# Patient Record
(demographics unavailable — no encounter records)

---

## 2024-12-11 NOTE — DISCUSSION/SUMMARY
[de-identified] : This patient presents today for follow-up of her left knee osteoarthritis.  Patient presents today with recurrence of his symptoms.  He is noted to have an exacerbation of the underlying osteoarthritis.  He did do well with viscosupplementation last year.  We discussed further treatment options and I recommended another course of viscosupplementation.  Will request authorization from the insurance carrier and call him on the medicine received in the office to begin treatment.  At least 30 minutes was spent performing the evaluation and management on today's office visit.  This includes but is not limited to preparing to see patient including review of any test results or outside medical records, obtaining and/or reviewing separately obtained history, performing examination and evaluation, counseling and educating the patient on their diagnosis and treatment recommendations, ordering medications, tests, or procedures, documenting clinical information in the electronic health record, independently interpreting results (not separately reported) and communicating results to the patient, and coordination of care.

## 2024-12-11 NOTE — PHYSICAL EXAM
[de-identified] : The patient appears well nourished  and in no apparent distress.  The patient is alert and oriented to person, place, and time.   Affect and mood appear normal.    The head is normocephalic and atraumatic.  The eyes reveal normal sclera and extra ocular muscles are intact.   The neck appears normal with no jugular venous distention or masses noted.   Skin shows normal turgor with no evidence of eczema or psoriasis.  No respiratory distress noted.  The patient ambulates with an antalgic gait.  The left knee has range of motion 0 to 130 degrees.  There is no pain with range of motion.  There is no effusion.  Crepitation is noted.  Tenderness about the patellofemoral joint noted.   There is a negative Eliel sign.  There is no soft tissue swelling, warmth, or erythema.   There is a negative Lachman sign.  There is no instability to varus/valgus stress.  There is no instability to anterior/posterior drawer.  There is normal strength  in the quadriceps and hamstring muscles.  Strength and sensation are intact distally.  There are normal pulses distally and good capillary refill.  No edema or lymphadenopathy noted.

## 2024-12-11 NOTE — REASON FOR VISIT
[Follow-Up Visit] : a follow-up visit for [Knee Pain] : knee pain [FreeTextEntry2] : follow up evaluation for primary osteoarthritis of he left knee.

## 2024-12-11 NOTE — HISTORY OF PRESENT ILLNESS
[de-identified] : This patient presents today for follow-up regarding left knee osteoarthritis.  He did have viscosupplementation in December of last year.  He is noting recurrence of pain over the last few months.  He feels he got about 10 months of relief of symptoms.  He presents today with pain level 8 out of 10.  Pain worse with activity such as ambulation and stair climbing.  Pain is improved with rest.  Takes tramadol and ibuprofen intermittently for the pain.  Denies any recent injury.  Denies radicular symptoms or numbness and tingling.

## 2024-12-16 NOTE — HISTORY OF PRESENT ILLNESS
[de-identified] : Patient is a 64-year-old male who presents today for an evaluation regarding his right hip.  He is status post right total hip replacement performed by Dr. Betancur in March 12, 2014.  Overall he states he has been doing very well but does have some residual discomfort at times.  He states it is mostly over the lateral aspect of the hip.  He has returned numerous times to the office.  And has received cortisone injection due to GTB.  Which does include improve his condition.  He states that he was doing well since his last office visit 1 year ago in October.  But started to notice over the summer some increased discomfort.  He denies any specific injury or accident.  Denies any pain radiating into the groin.

## 2024-12-16 NOTE — DISCUSSION/SUMMARY
[de-identified] : After thorough history full examination and review of the x-rays.  Patient was assured that he is doing very well following his right total hip replacement.  He was assured that the prosthesis is in place and shows good alignment with fixation.  However there is some lateral complaints of pain.  It was deemed that the patient does have some greater trochanteric bursitis.  And therefore has received injections in the past including 1 today.  He was explained the risk benefits pros and cons of the injection as well as alternatives.  He was also explained that there is no guarantee for complete relief.  And that if there is any relief this may be short-lived as the pain may return.  He was explained that the majority of his discomfort is laterally.  But we may also get an MRI to fully evaluate the prosthesis and surrounding tissues.  This was also given to the patient in the form of a prescription.  And advised to try the injection today and see if there is any improvement.  If he does feel an improvement and his symptoms disappear.  He is advised to continue with conservative measures including therapy exercises and anti-inflammatories.  But if the pain still persist he is advised to go for the MRI and notify the office once is performed to discuss its findings and further medical management.  35 minutes were spent, face to face, in direct consultation with the patient. This includes reviewing the natural history of their Dx., eliciting the history, performing an orthopedic exam, review of the x-ray findings, forming a differential Dx and discussing all treatment options. This Includes both surgical and non-surgical treatments. I also reviewed all the risks and benefits of non-operative & operative Tx options, future impact into orthopedic functions/problems, activity restrictions both at home and at work, and all follow up requirements.

## 2024-12-16 NOTE — PHYSICAL EXAM
[Antalgic] : antalgic [LE] : Sensory: Intact in bilateral lower extremities [ALL] : dorsalis pedis, posterior tibial, femoral, popliteal, and radial 2+ and symmetric bilaterally [de-identified] : On physical examination of the right hip.  The skin is clean dry and intact with a well-healed surgical scar noted.  There is no evidence of infection superficial or deep.  There is no redness swelling heat discharge fever noted.  There is some pain and tenderness noted but mostly on the lateral aspect of the hip at the area of the greater trochanteric bursa.  He does have excellent range of motion both passive and actively.  However against resistance there is some discomfort with abduction against resistance on the lateral aspect.  There is no evidence of limb length discrepancy.  No evidence of any muscle atrophy.  No evidence of any motor or sensory deficit.  Patient has good distal pulses with no calf tenderness. [de-identified] : X-rays of the right hip were taken in the office today. This includes 2 views. The AP pelvis and lateral hip. They reveal a status post left total hip replacement. The hardware is in perfect placement and shows excellent alignment as well as fixation. There is no evidence of limb length discrepancies. There is no evidence of any fracture, subsidence of the implant, dislocation or loosening of the prosthesis.

## 2024-12-16 NOTE — PROCEDURE
[Injection] : Injection [Right] : of the right [Greater Trochanter] : greater trochanteric bursa [Inflammation] : Inflammation [Patient] : patient [Risk] : risk [Benefits] : benefits [Alternatives] : alternatives [Bleeding] : bleeding [Infection] : infection [Allergic Reaction] : allergic reaction [Verbal Consent Obtained] : verbal consent was obtained prior to the procedure [Alcohol] : Alcohol [Ethyl Chloride Spray] : ethyl chloride spray was used as a topical anesthetic [Lateral] : lateral [Spinal] : spinal needle [1% Lidocaine___(mL)] : [unfilled] mL of 1% Lidocaine [Betameth. 6mg/mL___(mL)] : [unfilled] mL of 6mg/mL betamethasone [Bandage Applied] : a bandage [Tolerated Well] : The patient tolerated the procedure well [None] : none

## 2024-12-16 NOTE — REASON FOR VISIT
[Initial Visit] : an initial visit for [Hip Pain] : hip pain [Artificial Hip Joint] : an artificial hip joint [FreeTextEntry2] : Right total hip replacement 3/12/2014 by

## 2025-01-07 NOTE — PROCEDURE
[de-identified] : A 22gauge needle was sterilely placed onto the syringe of Euflexxa.  The  left knee was then sterilely prepped with chlorhexidine and ethylene chloride spray was used as an anesthetic just prior to the injection.  Under ultrasound guidance utilizing the Zepeda Lumify ultrasound probe the Euflexxa was injected into left knee joint just above and lateral to the patella into the suprapatellar pouch.  Placement of the injection into the suprapatellar pouch was confirmed by ultrasound and a spot image was saved.  The patient tolerated the procedure well without difficulty.  The patient was given instructions on the use of ice and anti-inflammatories for post injection site soreness.

## 2025-01-07 NOTE — PHYSICAL EXAM
[de-identified] : The patient appears well nourished  and in no apparent distress.  The patient is alert and oriented to person, place, and time.   Affect and mood appear normal.    The head is normocephalic and atraumatic.  The eyes reveal normal sclera and extra ocular muscles are intact.   The neck appears normal with no jugular venous distention or masses noted.   Skin shows normal turgor with no evidence of eczema or psoriasis.  No respiratory distress noted.  The patient ambulates with an antalgic gait.  The left knee has range of motion 0 to 130 degrees.  There is no pain with range of motion.  There is no effusion.  Crepitation is noted.  Tenderness about the patellofemoral joint noted.   There is a negative Eliel sign.  There is no soft tissue swelling, warmth, or erythema.   There is a negative Lachman sign.  There is no instability to varus/valgus stress.  There is no instability to anterior/posterior drawer.  There is normal strength  in the quadriceps and hamstring muscles.  Strength and sensation are intact distally.  There are normal pulses distally and good capillary refill.  No edema or lymphadenopathy noted.

## 2025-01-07 NOTE — HISTORY OF PRESENT ILLNESS
[de-identified] : This patient presents today for the first Euflexxa injection to the left knee.  Patient continues to complain of pain about the left knee which is 3 out of 10.  Pain worse with activity and improved with rest.  Patient takes Tylenol for the pain.

## 2025-01-07 NOTE — DISCUSSION/SUMMARY
[de-identified] : On todays visit we injected the  dose of hyaluronic acid without difficulty.  Instructions were given postinjection regarding ice, analgesics, and modification of activities.  I will see the patient back in one week for the next injection.

## 2025-01-14 NOTE — REASON FOR VISIT
[Follow-Up Visit] : a follow-up visit for [FreeTextEntry2] : Primary osteosrthritis of left knee, Euflexxa # 2

## 2025-01-14 NOTE — HISTORY OF PRESENT ILLNESS
[de-identified] : This patient presents today for the second Euflexxa injection to the left knee.  Patient notes slight improvement after the first injection.  No adverse effects noted.  Pain level 4-5 out of 10.

## 2025-01-14 NOTE — DISCUSSION/SUMMARY
[de-identified] : On todays visit we injected the  dose of hyaluronic acid without difficulty.  Instructions were given postinjection regarding ice, analgesics, and modification of activities.  I will see the patient back in one week for the next injection.

## 2025-01-14 NOTE — PHYSICAL EXAM
[de-identified] : The patient appears well nourished  and in no apparent distress.  The patient is alert and oriented to person, place, and time.   Affect and mood appear normal.    The head is normocephalic and atraumatic.  The eyes reveal normal sclera and extra ocular muscles are intact.   The neck appears normal with no jugular venous distention or masses noted.   Skin shows normal turgor with no evidence of eczema or psoriasis.  No respiratory distress noted.  The patient ambulates with an antalgic gait.  The left knee has range of motion 0 to 130 degrees.  There is no pain with range of motion.  There is no effusion.  Crepitation is noted.  Tenderness about the patellofemoral joint noted.   There is a negative Eliel sign.  There is no soft tissue swelling, warmth, or erythema.   There is a negative Lachman sign.  There is no instability to varus/valgus stress.  There is no instability to anterior/posterior drawer.  There is normal strength  in the quadriceps and hamstring muscles.  Strength and sensation are intact distally.  There are normal pulses distally and good capillary refill.  No edema or lymphadenopathy noted.

## 2025-01-14 NOTE — PROCEDURE
[de-identified] : A 22gauge needle was sterilely placed onto the syringe of Euflexxa.  The  left knee was then sterilely prepped with chlorhexidine and ethylene chloride spray was used as an anesthetic just prior to the injection.  Under ultrasound guidance utilizing the Zepeda Lumify ultrasound probe the Euflexxa was injected into left knee joint just above and lateral to the patella into the suprapatellar pouch.  Placement of the injection into the suprapatellar pouch was confirmed by ultrasound and a spot image was saved.  The patient tolerated the procedure well without difficulty.  The patient was given instructions on the use of ice and anti-inflammatories for post injection site soreness.

## 2025-01-21 NOTE — REASON FOR VISIT
[Follow-Up Visit] : a follow-up visit for [FreeTextEntry2] : Primary osteoarthritis of left knee here for Euflexxa #3

## 2025-01-21 NOTE — DISCUSSION/SUMMARY
[de-identified] : On today's visit we injected the last dose of hyaluronic acid to the knee under sterile conditions.  Instructions were given to the patient regarding postinjection pain for ice, analgesics, and modification of activities.  I will see the patient back in the office if they have recurrence of symptoms.

## 2025-01-21 NOTE — PROCEDURE
[de-identified] : A 22gauge needle was sterilely placed onto the syringe of Euflexxa.  The  left knee was then sterilely prepped with chlorhexidine and ethylene chloride spray was used as an anesthetic just prior to the injection.  Under ultrasound guidance utilizing the Zepeda Lumify ultrasound probe the Euflexxa was injected into left knee joint just above and lateral to the patella into the suprapatellar pouch.  Placement of the injection into the suprapatellar pouch was confirmed by ultrasound and a spot image was saved.  The patient tolerated the procedure well without difficulty.  The patient was given instructions on the use of ice and anti-inflammatories for post injection site soreness.

## 2025-01-21 NOTE — HISTORY OF PRESENT ILLNESS
[de-identified] : This patient presents today for the last Euflexxa injection to the left knee.  He is noting improvement after the first 2 injections.  Pain level 4-5 out of 10.  Pain is exacerbated by the cold weather.  He notes some slight swelling about the knee.  He uses Motrin for pain.

## 2025-01-21 NOTE — PHYSICAL EXAM
[de-identified] : The patient appears well nourished  and in no apparent distress.  The patient is alert and oriented to person, place, and time.   Affect and mood appear normal.    The head is normocephalic and atraumatic.  The eyes reveal normal sclera and extra ocular muscles are intact.   The neck appears normal with no jugular venous distention or masses noted.   Skin shows normal turgor with no evidence of eczema or psoriasis.  No respiratory distress noted.  The patient ambulates with an antalgic gait.  The left knee has range of motion 0 to 130 degrees.  There is no pain with range of motion.  There is no effusion.  Crepitation is noted.  Tenderness about the patellofemoral joint noted.   There is a negative Eliel sign.  There is no soft tissue swelling, warmth, or erythema.   There is a negative Lachman sign.  There is no instability to varus/valgus stress.  There is no instability to anterior/posterior drawer.  There is normal strength  in the quadriceps and hamstring muscles.  Strength and sensation are intact distally.  There are normal pulses distally and good capillary refill.  No edema or lymphadenopathy noted.

## 2025-01-27 NOTE — ASSESSMENT
[FreeTextEntry1] :  1. Journal all foods eaten every day, and especially for two days prior to seeing me again 2. either get me your most recent labs or go to a [facility] lab on an 8 hour fast 3. Non starchy vegetables with lunch and dinner: as much as you want 4. No eating out limit as much as poss, be careful on ordering, no soda 5 Exercise: the goal is for 150 min of cardiovascular exercise and 60 minutes of weight training per week, start with the seated exercises 6. 647 bread if interested 7. Nutritional evaluation in the future possible  8 Finish your last eating at best by 7 pm and at worst by 8 pm and you need 2 hours from your finished meal prior to going to bed 9. No alcohol  not an issue  10. Lots of plant based and whole grain foods: beans, brown rice, grains and starchy vegetables 11. Follow up 12 consider glp in the future: no family hx of thyroid cancers. letter of nodules in the thyroids or us of the thyroid.   65 min

## 2025-01-27 NOTE — REASON FOR VISIT
[Initial Evaluation] : an initial evaluation [FreeTextEntry1] :  The patient consents to this telehealth visit, is in [place], [place], I am in [place], [place], This service was provided using 2-way audiovisual technology. The patient and provider provider participated in this telehealth visit.

## 2025-01-27 NOTE — HISTORY OF PRESENT ILLNESS
[Improved Health] : Improved health [Quality of Life] : Quality of life [Improved Mobility] : Improved mobility [Young Adult] : yound adult [Cut/Track Calories] : cut/track calories [Low Carb Diet (Atkins/Keto)] : low carb diet (Atkins/Keto) [Exercise: ____] : exercise: [unfilled] [Poor eating habits] : poor eating habits [Cravings] : cravings [Portions/overeating] : portions/overeating [Medical conditions] : medical conditions [Change in activity level] : change in activity level [FreeTextEntry2] : 200 [FreeTextEntry3] : 235 [6] : 6 [I usually sleep 6-8 hours] : I usually sleep 6-8 hours [I snore] : I snore [Breakfast] : breakfast [Dinner] : dinner [Afternoon] : afternoon [Evening] : evening [Sweet] : sweet [Baked goods] : baked goods [Other: ___] : [unfilled] [Less than 1] : Dairy: less than 1 [1-2] : Sweets: 1-2 [2] : Fast food - meals per week: 2 [8+] : How many cups of water do you regularly drink per day: 8+ [1] : Cups of tea per day: 1 [Milk] : milk [Sugar] : sugar [Spouse/partner] : spouse/partner [Self] : self [Overlarge portions] : overlarge portions [Frequent Snacks] : frequent snacks [Eat Past Satisfaction] : eat past satisfaction [Skip Meals] : skip meals [Often Go For Seconds] : often go for seconds [Finish Your Plate Mentality] : finish your plate mentality [1 mile] : Walking distance capability:1 mile [Walking] : walking [0] : 0 [None] : none [] : No [FreeTextEntry1] : 64 year old male, with a history of HTNm HLD, who is more tired due to wt( 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   Breakfast:  coffee roll with tea with milk and sugar(2t)   lunch pizza water  frequently skips lunch  afternoon:  [employer] bar donut  Dinner: 7 pizza  with pizza  chicken cutlets/breaded and cooked with oil spaghetti with butter corn drink : soda or water dessert: on hour later: cake     bed: 11 pm up at 6am   Exer none  Eating out daily for the last month  Eat out when wife was well  No ETOH:

## 2025-02-24 NOTE — HISTORY OF PRESENT ILLNESS
[FreeTextEntry1] :  Here for follow up for weight management, down 15 labs since 1/30  Breakfast:  honey nut cheerios with whole milk tea  snack  fruits  Lunch  brown rice chicken  drink: water  snack  none  dinner:  6-7 as above and salad water dessert none  snack  +/- oreo cookies   ETOH: none  Eating out/take out : 1x a week lateley none  Exer: none: wife is now home from the hospital   Sleep: 6-7 hours

## 2025-02-24 NOTE — ASSESSMENT
[FreeTextEntry1] : 1. Journal all foods eaten every day, and especially for two days prior to seeing me again 2. Lab review: /38/79, A1c 5.6  3. Non starchy vegetables with lunch and dinner: as much as you want 4. No eating out limit as much as poss, be careful on ordering, no soda, no h oney nut cheerios , skim + or 2% 5 Exercise: the goal is for 150 min of cardiovascular exercise and 60 minutes of weight training per week, start with the seated exercises 6. 647 bread if interested 7. Nutritional evaluation in the future possible 8 Finish your last eating at best by 7 pm and at worst by 8 pm and you need 2 hours from your finished meal prior to going to bed, in crease sleep to 7-8 oinstead of 6-7  9. No alcohol not an issue 10. Lots of plant based and whole grain foods: beans, brown rice, grains and starchy vegetables 11. Follow up 12 consider glp in the future: no family hx of thyroid cancers. letter of nodules in the thyroids or us of the thyroid,  but so far doing well, so will not add meds.  13 vit D supp needed.

## 2025-02-24 NOTE — REASON FOR VISIT
[Follow-Up] : a follow-up visit [FreeTextEntry1] :  The patient consents to this telehealth visit, is in [facility], I am in [place], [place], This service was provided using 2-way audiovisual technology. The patient and provider provider participated in this telehealth visit.

## 2025-03-24 NOTE — HISTORY OF PRESENT ILLNESS
[FreeTextEntry1] :  Here for follow up for weight management.  Last seen on 2.24.25 and now down 5 lbs since  Breakfast:  special K , whole milk. , or raisin bread x 2  tea  snack  banana   lunch  fried rice balls, fried pasta , tomato and cheese  drink water dessert slice of cheese cake   or  pizza  or salad: veg and dressing : balsamic  drink water    dinner : 7 pizza grilled chicken with steamed mixed veggies   snack  no  ETOH: none   Eating out: none   Sleep: 7-8 hours

## 2025-03-24 NOTE — ASSESSMENT
[FreeTextEntry1] :  64 year old with a hx of cardiomyopathy, losing about 5 lbs per month now, needs to increase his exercise to increase his wt loss. 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 Journal all foods eaten every day, and especially for two days prior to seeing me again 2. Lab review: /38/79, A1c 5.6  3. Non starchy vegetables with lunch and dinner: as much as you want really needs to increase, improve breakfast, try oatmeal, eggs, fruits for breakfast. Protein shakes.  4. No eating out limit as much as poss, be careful on ordering, no soda, no h oney nut cheerios , skim + or 2% 5 Exercise: the goal is for 150 min of cardiovascular exercise and 60 minutes of weight training per week, start with the seated exercises 6. 647 bread if interested 7. Nutritional evaluation in the future possible 8 Finish your last eating at best by 7 pm and at worst by 8 pm and you need 2 hours from your finished meal prior to going to bed, increase sleep to 7-8 oinstead of 6-7  9. No alcohol not an issue 10. Lots of plant based and whole grain foods: beans, brown rice, grains and starchy vegetables 11. Follow up 12 consider glp in the future: no family hx of thyroid cancers. letter of nodules in the thyroids or us of the thyroid,  but so far doing well, so will not add meds. If wt loss stops then consider.  13 vit D supp needed.

## 2025-03-24 NOTE — REASON FOR VISIT
[Follow-Up] : a follow-up visit [FreeTextEntry1] :  The patient consents to this telehealth visit, is in  [place], [place], I am in [place], [place], This service was provided using 2-way audiovisual technology. The patient and provider provider participated in this telehealth visit.

## 2025-04-21 NOTE — HISTORY OF PRESENT ILLNESS
[FreeTextEntry1] :  Here for follow up for weight management. Last seen on 3.24 and was 215 today 213.   Breakfast:  Cheerios: whole milk:  hot tea: black   snack  +/- 100 michael pretzel snack  lunch  Grilled cheese with lite multigrain bread  drink: water or vitamin water   snack  none  Dinner: 6:30-7pm chicken with mixed steamed veggies drink; water dessert: none  snack  none   .Eating out/Ordering In:  1x a week: chinese steamed   ETOH : none   Exer7 miles on a recumbent bike QOD.   Sleep: 8-9 hour s

## 2025-04-21 NOTE — ASSESSMENT
[FreeTextEntry1] :  64 year old with a hx of cardiomyopathy, losing about 5 lbs per month now, needs to increase his exercise to increase his wt loss. 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 Journal all foods eaten every day, and especially for two days prior to seeing me again 2. Lab review: /38/79, A1c 5.6  3. Non starchy vegetables with lunch! and dinner: as much as you want really needs to increase, improve breakfast, try oatmeal, eggs, fruits, Activia  for breakfast. Protein shakes. Lunch needs less grilled cheese 4. No eating out limit as much as poss, be careful on ordering, no soda, no h oney nut cheerios , skim + or 2% 5 Exercise: the goal is for 150 min of cardiovascular exercise and 60 minutes of weight training per week, now on a recumbent bike QOD 7 miles QOD 30 min  with multilevel ride  6. 647 bread if interested 7. Nutritional evaluation in the future possible 8 Finish your last eating at best by 7 pm and at worst by 8 pm and you need 2 hours from your finished meal prior to going to bed, sleep is not 8-9 hours with Ambien nightly  9. No alcohol not an issue 10. Lots of plant based and whole grain foods: beans, brown rice, grains and starchy vegetables 11. Follow up 12 consider glp in the future: no family hx of thyroid cancers. letter of nodules in the thyroids or us of the thyroid,  but so far doing well, so will not add meds. If wt loss stops then consider.  13 vit D supp started and got a pap rash on his left arm, rec HC oint

## 2025-04-21 NOTE — REASON FOR VISIT
[Follow-Up] : a follow-up visit [FreeTextEntry1] :  The patient consents to this telehealth visit, is in [facility], I am in [place], [place], This service was provided using 2-way audiovisual technology. The patient and provider provider participated in this telehealth visit.

## 2025-05-19 NOTE — REASON FOR VISIT
[FreeTextEntry1] :  The patient consents to this telehealth visit, is in [place], [place], I am in [place], [place], This service was provided using 2-way audiovisual technology. The patient and provider provider participated in this telehealth visit.

## 2025-05-19 NOTE — HISTORY OF PRESENT ILLNESS
[FreeTextEntry1] :  Here for follow up for weight management. Last met on 4/21 and was 213 and 212. Had a diff month due to not being able to exer for two weeks( had ryan in the basement and could not get to the bike), this has finished and needs to get the area cleaned up to be able to use the area   Breakfast:  special K with berries and whole( cannot find the skim plus)  tea: black   snack:  fruits  Lunch  roasted chicken drink: water   snack  fruits  Dinner 6:30- 7  veal parm and broccoli  drink: diet soda dessert none   snack 8-8:30 +/- : fruits   .Eating out/Ordering In < 1x week: chinese steamed, made suggestions for [place] eg fish, no parm   ETOH none for 2 years   exer none   sleep: 7-8 h

## 2025-05-19 NOTE — ASSESSMENT
[FreeTextEntry1] :  64 year old with a hx of cardiomyopathy, losing about 5 lbs per month now, needs to increase his exercise to increase his wt loss. 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  1. Journal all foods eaten every day, and especially for two days prior to seeing me again 2. Lab review: /38/79, A1c 5.6  3. Non starchy vegetables with lunch! and dinner: as much as you want really needs to increase, improve breakfast, try oatmeal, eggs, fruits, Activia  for breakfast. Protein shakes. Lunch needs less grilled cheese( done) no snacking at night  4. No eating out limit as much as poss, be careful on ordering, no soda, no h oney nut cheerios , skim + or 2% 5 Exercise: the goal is for 150 min of cardiovascular exercise and 60 minutes of weight training per week, now on a recumbent bike QOD 7 miles QOD 30 min  with multilevel ride  6. 647 bread if interested 7. Nutritional evaluation in the future possible 8 Finish your last eating at best by 7 pm and at worst by 8 pm and you need 2 hours from your finished meal prior to going to bed, sleep is now 7-8 hours with Ambien nightly  9. No alcohol not an issue x 2 years.  10. Lots of plant based and whole grain foods: beans, brown rice, grains and starchy vegetables 11. Follow up 12 consider glp in the future: no family hx of thyroid cancers. letter of nodules in the thyroids or us of the thyroid,  but so far doing well, so will not add meds. If wt loss stops then consider.  The GLP  would be the best bet for him.  agrees to start, Explained the sx, early satiety, not to overeat, diarrhea, constipI fiber and or stool softener, MirLax) 13 vit D supp started and got a pap rash on his left arm, rec HC oint improved but continues to use the Vit D and needs to see if it stops all together.   30 min

## 2025-06-23 NOTE — HISTORY OF PRESENT ILLNESS
[FreeTextEntry1] :  Here for follow up for weight management Last seen on 5.19.25 when his wt was stable at 213, started on Zepbound, 2.5 and now at 205  breakfast cheerios and banana water  snack  +/- fruits   lunch  fruits deli meat without bread  snack  none   dinner 7 salad with cheese and croutons  snack none   .Eating out/Ordering In max 1x a week: fish taco  ETOH none  sleep: 8+  exer: bike 5miles per day: 25 min

## 2025-06-23 NOTE — ASSESSMENT
[FreeTextEntry1] : 64 year old with a hx of cardiomyopathy, losing about 5 lbs per month now, needs to increase his exercise to increase his wt loss. 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 Goal 150 min /week has a recumbent bike tand rides for 5 mils/25 min at 15 mph daily to add some upper body weights.  1. Journal all foods eaten every day, and especially for two days prior to seeing me again 2. Lab review: /38/79, A1c 5.6  will do labs after the next visit.  3. Non starchy vegetables with lunch! and dinner: as much as you want really needs to increase, improve breakfast, try oatmeal, eggs, fruits, Activia  for breakfast. Protein shakes. Lunch needs less grilled cheese( done) no snacking at night  4. No eating out limit as much as poss, be careful on ordering, no soda, no h oney nut cheerios , skim + or 2% 5 Exercise: the goal is for 150 min of cardiovascular exercise and 60 minutes of weight training per week, in add to the bike  6. 647 bread if interested 7. Nutritional evaluation  to ensure proper nutrition now he is eating so much less.  8 Finish your last eating at best by 7 pm and at worst by 8 pm and you need 2 hours from your finished meal prior to going to bed, sleep is now 7-8 hours with Ambien nightly  9. No alcohol not an issue x 2 years.  10. Lots of plant based and whole grain foods: beans, brown rice, grains and starchy vegetables 11. Follow up 12 Must increase the Zepbound to 5 per insurance: no family hx of thyroid cancers. letter of nodules in the thyroids or us of the thyroid,  agrees to contine, , Explained the sx, early satiety, not to overeat, diarrhea, constipI fiber and or stool softener, MirLax) 13 vit D supp and still taking and rash cleared up Zepbound improved,   30 min

## 2025-06-23 NOTE — REASON FOR VISIT
[Follow-Up] : a follow-up visit [FreeTextEntry1] :  The patient consents to this telehealth visit, is in [facility], I am in [place], [place], This service was provided using 2-way audiovisual technology. The patient and provider provider participated in this telehealth visit.

## 2025-06-26 NOTE — ASSESSMENT
[FreeTextEntry1] : 63-year-old male with elevated PSA. PI-RADS 3 lesion on MRI. Biopsy with one core with "Small focus of atypical glands highly suspicious for prostatic adenocarcinoma" - Last PSA 6.11 - Repeat MRI  - Discussed may need repeat biopsy

## 2025-06-26 NOTE — LETTER BODY
[Dear  ___] : Dear  [unfilled], [Consult Letter:] : I had the pleasure of evaluating your patient, [unfilled]. [Please see my note below.] : Please see my note below. [Consult Closing:] : Thank you very much for allowing me to participate in the care of this patient.  If you have any questions, please do not hesitate to contact me. [Sincerely,] : Sincerely, [FreeTextEntry2] : Danny Yin MD\par  [facility]par  [address] [place] [address], [address], [place] [address] [FreeTextEntry3] : Jose Godoy MD\par  [facility]\par  [address] [place] [address], [address]\par  [place], [place]\par  [address]\par  p: [phone]\par  f: [phone]

## 2025-06-26 NOTE — LETTER BODY
[Dear  ___] : Dear  [unfilled], [Consult Letter:] : I had the pleasure of evaluating your patient, [unfilled]. [Please see my note below.] : Please see my note below. [Consult Closing:] : Thank you very much for allowing me to participate in the care of this patient.  If you have any questions, please do not hesitate to contact me. [Sincerely,] : Sincerely, [FreeTextEntry2] : Danny Yin MD\par  [facility]par  [address] [place] [address], [address], [place] [address] [FreeTextEntry3] : Jose Godoy MD\par  [facility] [place]\par  [address] [place] [address], [address]\par  [place], [place]\par  [address]\par  p: [phone]\par  f: [phone]

## 2025-06-26 NOTE — PHYSICAL EXAM
[Normal Appearance] : normal appearance [Edema] : no peripheral edema [Exaggerated Use Of Accessory Muscles For Inspiration] : no accessory muscle use [Bowel Sounds] : normal bowel sounds [Abdomen Tenderness] : non-tender [Urethral Meatus] : meatus normal [Epididymis] : the epididymides were normal [Testes Tenderness] : no tenderness of the testes [Testes Mass (___cm)] : there were no testicular masses [No Prostate Nodules] : no prostate nodules [Prostate Tenderness] : the prostate was not tender